# Patient Record
Sex: FEMALE | Employment: FULL TIME | URBAN - METROPOLITAN AREA
[De-identification: names, ages, dates, MRNs, and addresses within clinical notes are randomized per-mention and may not be internally consistent; named-entity substitution may affect disease eponyms.]

---

## 2018-08-22 ENCOUNTER — APPOINTMENT (OUTPATIENT)
Dept: OTHER | Facility: HOSPITAL | Age: 23
End: 2018-08-22
Attending: PREVENTIVE MEDICINE

## 2019-04-08 PROCEDURE — 87389 HIV-1 AG W/HIV-1&-2 AB AG IA: CPT | Performed by: OBSTETRICS & GYNECOLOGY

## 2019-04-08 PROCEDURE — 80074 ACUTE HEPATITIS PANEL: CPT | Performed by: OBSTETRICS & GYNECOLOGY

## 2019-04-08 PROCEDURE — 88175 CYTOPATH C/V AUTO FLUID REDO: CPT | Performed by: OBSTETRICS & GYNECOLOGY

## 2019-07-20 PROBLEM — Z97.5 IUD (INTRAUTERINE DEVICE) IN PLACE: Status: ACTIVE | Noted: 2019-07-20

## 2024-03-01 ENCOUNTER — LAB ENCOUNTER (OUTPATIENT)
Dept: LAB | Facility: REFERENCE LAB | Age: 29
End: 2024-03-01
Attending: FAMILY MEDICINE
Payer: COMMERCIAL

## 2024-03-01 ENCOUNTER — OFFICE VISIT (OUTPATIENT)
Facility: CLINIC | Age: 29
End: 2024-03-01
Payer: COMMERCIAL

## 2024-03-01 VITALS
OXYGEN SATURATION: 99 % | HEART RATE: 81 BPM | HEIGHT: 67.32 IN | WEIGHT: 147.25 LBS | DIASTOLIC BLOOD PRESSURE: 60 MMHG | BODY MASS INDEX: 22.84 KG/M2 | SYSTOLIC BLOOD PRESSURE: 110 MMHG

## 2024-03-01 DIAGNOSIS — F98.8 ATTENTION DEFICIT DISORDER (ADD) WITHOUT HYPERACTIVITY: ICD-10-CM

## 2024-03-01 DIAGNOSIS — Z97.5 IUD (INTRAUTERINE DEVICE) IN PLACE: ICD-10-CM

## 2024-03-01 DIAGNOSIS — Z00.00 ENCOUNTER FOR GENERAL ADULT MEDICAL EXAMINATION W/O ABNORMAL FINDINGS: Primary | ICD-10-CM

## 2024-03-01 DIAGNOSIS — Z12.4 CERVICAL CANCER SCREENING: ICD-10-CM

## 2024-03-01 DIAGNOSIS — F32.5 MAJOR DEPRESSIVE DISORDER WITH SINGLE EPISODE, IN FULL REMISSION (HCC): ICD-10-CM

## 2024-03-01 DIAGNOSIS — Z00.00 ENCOUNTER FOR GENERAL ADULT MEDICAL EXAMINATION W/O ABNORMAL FINDINGS: ICD-10-CM

## 2024-03-01 DIAGNOSIS — R22.0 SCALP MASS: ICD-10-CM

## 2024-03-01 DIAGNOSIS — Z23 NEED FOR VACCINATION: ICD-10-CM

## 2024-03-01 LAB
ALBUMIN SERPL-MCNC: 4.8 G/DL (ref 3.2–4.8)
ALBUMIN/GLOB SERPL: 1.6 {RATIO} (ref 1–2)
ALP LIVER SERPL-CCNC: 47 U/L
ALT SERPL-CCNC: 18 U/L
ANION GAP SERPL CALC-SCNC: 8 MMOL/L (ref 0–18)
AST SERPL-CCNC: 26 U/L (ref ?–34)
BASOPHILS # BLD AUTO: 0.05 X10(3) UL (ref 0–0.2)
BASOPHILS NFR BLD AUTO: 0.8 %
BILIRUB SERPL-MCNC: 0.5 MG/DL (ref 0.3–1.2)
BUN BLD-MCNC: 14 MG/DL (ref 9–23)
BUN/CREAT SERPL: 18.9 (ref 10–20)
CALCIUM BLD-MCNC: 9.6 MG/DL (ref 8.7–10.4)
CHLORIDE SERPL-SCNC: 109 MMOL/L (ref 98–112)
CHOLEST SERPL-MCNC: 183 MG/DL (ref ?–200)
CO2 SERPL-SCNC: 23 MMOL/L (ref 21–32)
CREAT BLD-MCNC: 0.74 MG/DL
DEPRECATED RDW RBC AUTO: 42.5 FL (ref 35.1–46.3)
EGFRCR SERPLBLD CKD-EPI 2021: 112 ML/MIN/1.73M2 (ref 60–?)
EOSINOPHIL # BLD AUTO: 0.28 X10(3) UL (ref 0–0.7)
EOSINOPHIL NFR BLD AUTO: 4.2 %
ERYTHROCYTE [DISTWIDTH] IN BLOOD BY AUTOMATED COUNT: 11.9 % (ref 11–15)
EST. AVERAGE GLUCOSE BLD GHB EST-MCNC: 85 MG/DL (ref 68–126)
FASTING PATIENT LIPID ANSWER: YES
FASTING STATUS PATIENT QL REPORTED: YES
GLOBULIN PLAS-MCNC: 3 G/DL (ref 2.8–4.4)
GLUCOSE BLD-MCNC: 84 MG/DL (ref 70–99)
HBA1C MFR BLD: 4.6 % (ref ?–5.7)
HCT VFR BLD AUTO: 39.7 %
HDLC SERPL-MCNC: 60 MG/DL (ref 40–59)
HGB BLD-MCNC: 13.7 G/DL
IMM GRANULOCYTES # BLD AUTO: 0.02 X10(3) UL (ref 0–1)
IMM GRANULOCYTES NFR BLD: 0.3 %
LDLC SERPL CALC-MCNC: 105 MG/DL (ref ?–100)
LYMPHOCYTES # BLD AUTO: 1.86 X10(3) UL (ref 1–4)
LYMPHOCYTES NFR BLD AUTO: 28.1 %
MCH RBC QN AUTO: 33.4 PG (ref 26–34)
MCHC RBC AUTO-ENTMCNC: 34.5 G/DL (ref 31–37)
MCV RBC AUTO: 96.8 FL
MONOCYTES # BLD AUTO: 0.45 X10(3) UL (ref 0.1–1)
MONOCYTES NFR BLD AUTO: 6.8 %
NEUTROPHILS # BLD AUTO: 3.97 X10 (3) UL (ref 1.5–7.7)
NEUTROPHILS # BLD AUTO: 3.97 X10(3) UL (ref 1.5–7.7)
NEUTROPHILS NFR BLD AUTO: 59.8 %
NONHDLC SERPL-MCNC: 123 MG/DL (ref ?–130)
OSMOLALITY SERPL CALC.SUM OF ELEC: 290 MOSM/KG (ref 275–295)
PLATELET # BLD AUTO: 343 10(3)UL (ref 150–450)
POTASSIUM SERPL-SCNC: 3.6 MMOL/L (ref 3.5–5.1)
PROT SERPL-MCNC: 7.8 G/DL (ref 5.7–8.2)
RBC # BLD AUTO: 4.1 X10(6)UL
SODIUM SERPL-SCNC: 140 MMOL/L (ref 136–145)
TRIGL SERPL-MCNC: 98 MG/DL (ref 30–149)
TSI SER-ACNC: 0.65 MIU/ML (ref 0.55–4.78)
VLDLC SERPL CALC-MCNC: 16 MG/DL (ref 0–30)
WBC # BLD AUTO: 6.6 X10(3) UL (ref 4–11)

## 2024-03-01 PROCEDURE — 3074F SYST BP LT 130 MM HG: CPT | Performed by: FAMILY MEDICINE

## 2024-03-01 PROCEDURE — 80061 LIPID PANEL: CPT | Performed by: FAMILY MEDICINE

## 2024-03-01 PROCEDURE — 88175 CYTOPATH C/V AUTO FLUID REDO: CPT | Performed by: FAMILY MEDICINE

## 2024-03-01 PROCEDURE — 83036 HEMOGLOBIN GLYCOSYLATED A1C: CPT | Performed by: FAMILY MEDICINE

## 2024-03-01 PROCEDURE — 90686 IIV4 VACC NO PRSV 0.5 ML IM: CPT | Performed by: FAMILY MEDICINE

## 2024-03-01 PROCEDURE — 99385 PREV VISIT NEW AGE 18-39: CPT | Performed by: FAMILY MEDICINE

## 2024-03-01 PROCEDURE — 90715 TDAP VACCINE 7 YRS/> IM: CPT | Performed by: FAMILY MEDICINE

## 2024-03-01 PROCEDURE — 3008F BODY MASS INDEX DOCD: CPT | Performed by: FAMILY MEDICINE

## 2024-03-01 PROCEDURE — 80050 GENERAL HEALTH PANEL: CPT | Performed by: FAMILY MEDICINE

## 2024-03-01 PROCEDURE — 3078F DIAST BP <80 MM HG: CPT | Performed by: FAMILY MEDICINE

## 2024-03-01 PROCEDURE — 90472 IMMUNIZATION ADMIN EACH ADD: CPT | Performed by: FAMILY MEDICINE

## 2024-03-01 PROCEDURE — 90471 IMMUNIZATION ADMIN: CPT | Performed by: FAMILY MEDICINE

## 2024-03-01 RX ORDER — EPINEPHRINE 0.3 MG/.3ML
0.3 INJECTION SUBCUTANEOUS ONCE
COMMUNITY

## 2024-03-01 RX ORDER — AMPHETAMINE 9.4 MG/1
9.4 TABLET, ORALLY DISINTEGRATING ORAL DAILY
COMMUNITY
Start: 2024-02-15

## 2024-03-01 RX ORDER — FLUOXETINE HYDROCHLORIDE 20 MG/1
20 CAPSULE ORAL DAILY
COMMUNITY

## 2024-03-01 NOTE — PROGRESS NOTES
Subjective:   Ruth Rodriguez is a 28 year old female who presents for Establish Care (Patient comes to the office as a new patient to establish care. ) and Physical (Patient is requesting annual physical exam. )     Head trauma  Occurred 6 years ago. Fell backwards onto staircase and hit occiput. Did not seek care. No loss of consciousness. Still has palpable mass in that area. Not growing. Not painful.    ADD/Depression  On adzenys and fluoxetine per psychiatry. Doing well.         No LMP recorded. (Menstrual status: IUD - Intrauterine Device).  Has Kyleena in place. Due to get removed and reinserted this year.     Last pap 2019. Due. Never abnormal    History/Other:    Chief Complaint Reviewed and Verified  Nursing Notes Reviewed and   Verified  Tobacco Reviewed  Allergies Reviewed  Medications Reviewed    Problem List Reviewed  Medical History Reviewed  Surgical History   Reviewed  Family History Reviewed  Social History Reviewed         Tobacco:       Current Outpatient Medications   Medication Sig Dispense Refill    FLUoxetine 20 MG Oral Cap Take 1 capsule (20 mg total) by mouth daily.      ADZENYS XR-ODT 9.4 MG Oral Tablet Extended Release Dispersible Take 9.4 mg by mouth daily.      EPINEPHrine (EPIPEN 2-SALIMA) 0.3 MG/0.3ML Injection Solution Auto-injector Inject 0.3 mL (1 each total) as directed one time.           Review of Systems:  Review of Systems   Constitutional: Negative.    HENT: Negative.     Eyes: Negative.    Respiratory: Negative.     Cardiovascular: Negative.    Gastrointestinal: Negative.    Genitourinary: Negative.    Musculoskeletal: Negative.    Skin: Negative.         +mass on head   Neurological: Negative.    Psychiatric/Behavioral: Negative.           Objective:   /60 (BP Location: Left arm, Patient Position: Sitting, Cuff Size: adult)   Pulse 81   Ht 5' 7.32\" (1.71 m)   Wt 147 lb 4 oz (66.8 kg)   SpO2 99%   BMI 22.84 kg/m²  Estimated body mass index is 22.84 kg/m² as  calculated from the following:    Height as of this encounter: 5' 7.32\" (1.71 m).    Weight as of this encounter: 147 lb 4 oz (66.8 kg).  Physical Exam  Vitals and nursing note reviewed.   Constitutional:       General: She is not in acute distress.     Appearance: Normal appearance. She is not ill-appearing.   HENT:      Head: Normocephalic and atraumatic.      Comments: +hard mass on occiput, midline, nontender, nonmobile, 1 cm in diameter     Right Ear: Tympanic membrane normal. There is no impacted cerumen.      Left Ear: Tympanic membrane normal. There is no impacted cerumen.      Mouth/Throat:      Mouth: Mucous membranes are moist.      Pharynx: Oropharynx is clear. No oropharyngeal exudate or posterior oropharyngeal erythema.   Eyes:      General:         Right eye: No discharge.         Left eye: No discharge.      Extraocular Movements: Extraocular movements intact.      Pupils: Pupils are equal, round, and reactive to light.   Cardiovascular:      Rate and Rhythm: Normal rate and regular rhythm.      Heart sounds: Normal heart sounds. No murmur heard.     No friction rub. No gallop.   Pulmonary:      Effort: Pulmonary effort is normal.      Breath sounds: Normal breath sounds. No wheezing, rhonchi or rales.   Abdominal:      General: Abdomen is flat. Bowel sounds are normal. There is no distension.      Palpations: Abdomen is soft. There is no mass.      Tenderness: There is no abdominal tenderness. There is no guarding or rebound.   Genitourinary:     Cervix: Normal. No cervical motion tenderness, discharge, friability, lesion, erythema or cervical bleeding.   Musculoskeletal:         General: Normal range of motion.      Cervical back: Normal range of motion.      Right lower leg: No edema.      Left lower leg: No edema.   Skin:     General: Skin is warm and dry.      Findings: No rash.   Neurological:      General: No focal deficit present.      Mental Status: She is alert. Mental status is at  baseline.   Psychiatric:         Mood and Affect: Mood normal.         Behavior: Behavior normal.         Assessment & Plan:   1. Encounter for general adult medical examination w/o abnormal findings (Primary)  -     TETANUS, DIPHTHERIA TOXOIDS AND ACELLULAR PERTUSIS VACCINE (TDAP), >7 YEARS, IM USE  -     Fluzone Quadrivalent 6mo+ 0.5mL  -     CBC With Differential With Platelet; Future; Expected date: 03/01/2024  -     Lipid Panel; Future; Expected date: 03/01/2024  -     Comp Metabolic Panel (14); Future; Expected date: 03/01/2024  -     Hemoglobin A1C; Future; Expected date: 03/01/2024  -     TSH W Reflex To Free T4; Future; Expected date: 03/01/2024    -collected labs. 3 hours fasted.     2. Cervical cancer screening  -     ThinPrep PAP with HPV Reflex Request B; Future; Expected date: 03/01/2024    -collected    3. Need for vaccination  -     TETANUS, DIPHTHERIA TOXOIDS AND ACELLULAR PERTUSIS VACCINE (TDAP), >7 YEARS, IM USE  -     Fluzone Quadrivalent 6mo+ 0.5mL    -administered by staff    4. Attention deficit disorder (ADD) without hyperactivity  -well controlled on current medications. No regimen changes. To continue following with psychiatry    5. IUD (intrauterine device) in place  -patient will inform provider when would like to schedule for replacement and reinsertion    6. Scalp mass  Given no change in the past 6 years and no associated symptoms. Recommended monitoring. If begins to grow or become painful to come back in for reevaluation    7. Major depressive disorder with single episode, in full remission (HCC)  -well controlled on current medications. No regimen changes. To continue following with psychiatry        Return in about 1 year (around 3/1/2025), or if symptoms worsen or fail to improve.    Danielle Becerril MD, 3/1/2024, 11:13 AM

## 2024-06-14 ENCOUNTER — PATIENT MESSAGE (OUTPATIENT)
Facility: CLINIC | Age: 29
End: 2024-06-14

## 2024-06-14 NOTE — TELEPHONE ENCOUNTER
From: Ruth Rodriguez  To: Danielle Becerril  Sent: 6/14/2024 12:15 PM CDT  Subject: Constant Cold Sores    Hi Dr. Becerril! I have struggled with cold sores my whole life but they are starting to get worse and more painful. I was talking to my sister about it and she mentioned she was put on a medication to help and hasn’t gotten one since. I was hoping we could possibly discuss my options? I attached a photo for reference. Thank you!

## 2024-10-28 ENCOUNTER — OFFICE VISIT (OUTPATIENT)
Facility: CLINIC | Age: 29
End: 2024-10-28
Payer: COMMERCIAL

## 2024-10-28 VITALS
HEART RATE: 69 BPM | HEIGHT: 68 IN | OXYGEN SATURATION: 99 % | DIASTOLIC BLOOD PRESSURE: 80 MMHG | SYSTOLIC BLOOD PRESSURE: 122 MMHG | BODY MASS INDEX: 24.4 KG/M2 | WEIGHT: 161 LBS

## 2024-10-28 DIAGNOSIS — B00.1 RECURRENT COLD SORES: Primary | ICD-10-CM

## 2024-10-28 DIAGNOSIS — Z30.433 ENCOUNTER FOR IUD REMOVAL AND REINSERTION: ICD-10-CM

## 2024-10-28 LAB — POCT MAN UR PREGNANCY: NEGATIVE

## 2024-10-28 PROCEDURE — 87491 CHLMYD TRACH DNA AMP PROBE: CPT | Performed by: FAMILY MEDICINE

## 2024-10-28 PROCEDURE — 87591 N.GONORRHOEAE DNA AMP PROB: CPT | Performed by: FAMILY MEDICINE

## 2024-10-28 RX ORDER — LEVONORGESTREL 19.5 MG/1
1 INTRAUTERINE DEVICE INTRAUTERINE ONCE
COMMUNITY
End: 2024-10-28

## 2024-10-28 RX ORDER — VALACYCLOVIR HYDROCHLORIDE 1 G/1
1000 TABLET, FILM COATED ORAL DAILY
Qty: 90 TABLET | Refills: 1 | Status: SHIPPED | OUTPATIENT
Start: 2024-10-28

## 2024-10-28 NOTE — PROGRESS NOTES
Subjective:   Ruth Rodriguez is a 29 year old female who presents for Procedure (IUD insertion and removal)     Patient presents for IUD insertion and would like to discuss cold sores. Gets an outbreak every month. Sister recently was placed on a preventative medications which has helped. Patient is wondering if can try similar medication.     History/Other:    Chief Complaint Reviewed and Verified  Nursing Notes Reviewed and   Verified  Tobacco Reviewed  Allergies Reviewed  Medications Reviewed    Problem List Reviewed  Medical History Reviewed  Surgical History   Reviewed  OB Status Reviewed  Family History Reviewed  Social History   Reviewed         Tobacco:  Social History     Tobacco Use   Smoking Status Never   Smokeless Tobacco Current     E-Cigarettes/Vaping       Questions Responses    E-Cigarette Use User - Current Status Unknown               Current Outpatient Medications   Medication Sig Dispense Refill    valACYclovir 1 G Oral Tab Take 1 tablet (1,000 mg total) by mouth daily. 90 tablet 1    FLUoxetine 20 MG Oral Cap Take 1 capsule (20 mg total) by mouth daily.      ADZENYS XR-ODT 9.4 MG Oral Tablet Extended Release Dispersible Take 9.4 mg by mouth daily.      EPINEPHrine (EPIPEN 2-SALIMA) 0.3 MG/0.3ML Injection Solution Auto-injector Inject 0.3 mL (1 each total) as directed one time.           Review of Systems:  Review of Systems   Constitutional: Negative.    Respiratory: Negative.     Cardiovascular: Negative.    Gastrointestinal: Negative.    Skin:         +cold sores   Neurological: Negative.          Objective:   /80   Pulse 69   Ht 5' 8\" (1.727 m)   Wt 161 lb (73 kg)   SpO2 99%   BMI 24.48 kg/m²  Estimated body mass index is 24.48 kg/m² as calculated from the following:    Height as of this encounter: 5' 8\" (1.727 m).    Weight as of this encounter: 161 lb (73 kg).  Physical Exam  Vitals and nursing note reviewed.   Constitutional:       General: She is not in acute  distress.     Appearance: Normal appearance.   HENT:      Head: Normocephalic and atraumatic.   Eyes:      General:         Right eye: No discharge.         Left eye: No discharge.      Comments: Extraocular eye movements grossly intact   Pulmonary:      Effort: Pulmonary effort is normal.   Musculoskeletal:      Comments: Normal gait   Skin:     Findings: No rash.   Neurological:      General: No focal deficit present.      Mental Status: She is alert. Mental status is at baseline.   Psychiatric:         Mood and Affect: Mood normal.         Behavior: Behavior normal.           Assessment & Plan:   1. Recurrent cold sores (Primary)  -     valACYclovir HCl; Take 1 tablet (1,000 mg total) by mouth daily.  Dispense: 90 tablet; Refill: 1    -sent in suppressive dose valacyclovir for patient to trial    2. Encounter for IUD removal and reinsertion  -     POCT Pregnancy, Urine--> negative  -     Chlamydia/Gc Amplification; Future; Expected date: 10/28/2024  -     Chlamydia/Gc Amplification  -     Levonorgestrel    -removed and reinserted kyleena IUD. Patient tolerated well. See separate procedure note      Return in about 4 weeks (around 11/25/2024) for IUD string check.    Danielle Becerril MD, 10/28/2024, 2:33 PM

## 2024-10-28 NOTE — PROGRESS NOTES
IUD Insertion     Pregnancy Results: negative from urine test   Birth control method(s) used: Kyleena IUD; date last used: 10/28/2024    Consent signed.  Procedure discussed with the patient in detail including indication, risks, benefits, alternatives and complications.    Pelvic Exam Findings:  Uterus is anteverted.     Procedure:  Speculum placed in the vagina.  Betadine wash of vagina and cervix.  Single tooth tenaculum was placed at the 12 o'clock position.  Cervical stenosis not encountered.   Uterus sounded to 8 cm.  Kyleena  IUD was placed without difficulty.  Strings cut at 3 cm.  Single tooth tenaculum removed.  Cotton swabs used to achieve hemostasis.  Good hemostasis noted.  GC/CHL screen performed.  Patient tolerated procedure well.      Visit Plan:  IUD surveillance was discussed with the patient.    Danielle Becerril MD  10/28/2024  2:21 PM

## 2024-10-28 NOTE — PATIENT INSTRUCTIONS
IUD Insertion Aftercare    Progestin IUD (Mirena®, Liletta®, Santa®)   It begins working in 7 days to prevent pregnancy.   You MUST use condoms for the first 7 days after your IUD was inserted. If you have sex without using a condom, you will need to take emergency contraception as soon as possible to prevent pregnancy.    Today you may go back to school or work after your visit. You must wait 24 hours after your IUD is put in before you can use tampons, take a bath, or have vaginal sex.  You may have more cramps or heavier bleeding with your periods, or spotting between your periods. This is normal. The cramping and bleeding can last for 3-6 months. After 6 months, the  cramping and bleeding should get better. Many women will stop having periods after 1 or 2 years with IUDs. If you have the Paragard® (copper) IUD, you may have more cramping and more bleeding with your periods as long as you have the IUD inside you.    Ibuprofen (also called Advil® or Motrin®) helps decrease the bleeding and cramping. You can buy Ibuprofen at any drug store without a prescription. You can take as many as 4 pills (800 mg) of Ibuprofen every 8 hours with food (each pill contains 200 mg). To prevent cramping, start taking Ibuprofen as soon as your period starts and keep taking it every 8 hours for the first 2-3 days of your period. You can also put a hot water bottle on your belly if you have bad cramps. Your IUD may come out by itself in the first three months. If you can feel the strings, the IUD is in the right place. If your IUD comes out, you can become pregnant immediately. If you are not sure how to check the strings, we can help you. Meanwhile, use condoms.     The IUD does NOT protect you against sexually transmitted infections. ALWAYS use protection against sexually transmitted infections (male condoms, female condoms, dental dams) if you are at risk. If you think you have been exposed to an STI, discuss testing with your  healthcare provider. Most infections can be treated WITHOUT removing your IUD.    WARNING SIGNS:  Within the first 3 weeks of the IUD insertion:  - Fever (>101F)  - Chills  - Strong or sharp pain in your stomach or belly    At Any Time (these are very rare):  - Late period (for Paragard® users)  - Feeling pregnant (breast tenderness, nausea, vomiting)  - Positive home pregnancy test    If you develop any of the above warning signs, please see your healthcare provider immediately.

## 2024-10-29 LAB
C TRACH DNA SPEC QL NAA+PROBE: NEGATIVE
N GONORRHOEA DNA SPEC QL NAA+PROBE: NEGATIVE

## 2024-12-16 DIAGNOSIS — B00.1 RECURRENT COLD SORES: ICD-10-CM

## 2024-12-19 RX ORDER — VALACYCLOVIR HYDROCHLORIDE 1 G/1
1000 TABLET, FILM COATED ORAL DAILY
Qty: 90 TABLET | Refills: 1 | OUTPATIENT
Start: 2024-12-19

## 2024-12-20 NOTE — TELEPHONE ENCOUNTER
Refill passed per [facility] protocol.    Requested Prescriptions   Pending Prescriptions Disp Refills    FLUoxetine 20 MG Oral Cap  0     Sig: Take 1 capsule (20 mg total) by mouth daily.       Psychiatric Non-Scheduled (Anti-Anxiety) Passed - 12/19/2024  9:23 PM        Passed - In person appointment or virtual visit in the past 6 mos or appointment in next 3 mos     Recent Outpatient Visits              1 month ago Recurrent cold sores    [facility] Danielle Becerril MD    Office Visit    9 months ago Encounter for general adult medical examination w/o abnormal findings    [facility] Danielle Becerril MD    Office Visit    3 years ago Tinea versicolor    Dermatology - [facility], Poppy Nuñez PA    Office Visit    5 years ago Vaginal discharge    OB-GYN - [address], Clara Nelson MD    Office Visit    5 years ago Vitamin B12 deficiency    Family Medicine - Ronak Ziegler, [place]    Nurse Only          Future Appointments         Provider Department Appt Notes    In 1 week Danielle Becerril MD [facility] Check after IUD insertion  F/U; [employer] PPO                    Passed - Depression Screening completed within the past 12 months          valACYclovir 1 G Oral Tab 90 tablet 1     Sig: Take 1 tablet (1,000 mg total) by mouth daily.       Herpes Agent Protocol Passed - 12/19/2024  9:23 PM        Passed - In person appointment or virtual visit in the past 12 mos or appointment in next 3 mos     Recent Outpatient Visits              1 month ago Recurrent cold sores    [facility] Danielle Becerril MD    Office Visit    9 months ago Encounter for general adult medical examination w/o abnormal findings    [facility] Danielle Becerril MD    Office Visit    3 years ago Tinea  versicolor    Dermatology - [facility] [address], Poppy Nuñez PA    Office Visit    5 years ago Vaginal discharge    OB-GYN - [place] V[place] Christine B, MD    Office Visit    5 years ago Vitamin B12 deficiency    Family Medicine - Ronak Ziegler, [place]    Nurse Only          Future Appointments         Provider Department Appt Notes    In 1 week Danielle Becerril MD [facility] Check after IUD insertion  F/U; [employer] PPO                       Future Appointments         Provider Department Appt Notes    In 1 week Danielle Becerril MD [facility] Check after IUD insertion  F/U; [employer] PPO          Recent Outpatient Visits              1 month ago Recurrent cold sores    [facility] Danielle Becerril MD    Office Visit    9 months ago Encounter for general adult medical examination w/o abnormal findings    [facility] Danielle Becerril MD    Office Visit    3 years ago Tinea versicolor    Dermatology - [facility] [address], Poppy Nuñez PA    Office Visit    5 years ago Vaginal discharge    OB-GYN - [place] Christine B, MD    Office Visit    5 years ago Vitamin B12 deficiency    Family Medicine - Ronak Ziegler, [place]    Nurse Only

## 2024-12-20 NOTE — TELEPHONE ENCOUNTER
Refill passed per [facility] protocol.    Medication is listed as patient reported, not yet prescribed by provider     Routing to POD mate as Dr Becerril is out of the office     Medication List  As of 10/28/2024  2:59 PM    Amphetamine 9.4 mg Daily    EPINEPHrine 0.3 MG/0.3ML 0.3 mL Once    FLUoxetine HCl 20 mg Daily    valACYclovir HCl 1,000 mg Oral Daily    Requested Prescriptions   Pending Prescriptions Disp Refills    FLUoxetine 20 MG Oral Cap  0     Sig: Take 1 capsule (20 mg total) by mouth daily.       Psychiatric Non-Scheduled (Anti-Anxiety) Passed - 12/19/2024  9:24 PM        Passed - In person appointment or virtual visit in the past 6 mos or appointment in next 3 mos     Recent Outpatient Visits              1 month ago Recurrent cold sores    [facility] Danielle Becerril MD    Office Visit    9 months ago Encounter for general adult medical examination w/o abnormal findings    [facility] Danielle Becerril MD    Office Visit    3 years ago Tinea versicolor    Dermatology - [facility] Chloe Poppy Nuñez PA    Office Visit    5 years ago Vaginal discharge    OB-GYN - [place] Vora Christine B, MD    Office Visit    5 years ago Vitamin B12 deficiency    Family Medicine - Ronak Ziegler, [place]    Nurse Only          Future Appointments         Provider Department Appt Notes    In 1 week Danielle Becerril MD [facility] Check after IUD insertion  F/U; [employer] PPO                    Passed - Depression Screening completed within the past 12 months          valACYclovir 1 G Oral Tab 90 tablet 1     Sig: Take 1 tablet (1,000 mg total) by mouth daily.       Herpes Agent Protocol Passed - 12/19/2024  9:24 PM        Passed - In person appointment or virtual visit in the past 12 mos or appointment in next 3 mos     Recent Outpatient Visits              1  month ago Recurrent cold sores    [facility] Danielle Becerril MD    Office Visit    9 months ago Encounter for general adult medical examination w/o abnormal findings    [facility] Danielle Becerril MD    Office Visit    3 years ago Tinea versicolor    Dermatology - [facility], Poppy Nuñez PA    Office Visit    5 years ago Vaginal discharge    OB-GYN - [address], Clara Nelson MD    Office Visit    5 years ago Vitamin B12 deficiency    Family Medicine - Ronak Ziegler, [place]    Nurse Only          Future Appointments         Provider Department Appt Notes    In 1 week Danielle Becerril MD [facility] Check after IUD insertion  F/U; [employer] PPO                       Future Appointments         Provider Department Appt Notes    In 1 week Danielle Becerril MD [facility] Check after IUD insertion  F/U; [employer] PPO          Recent Outpatient Visits              1 month ago Recurrent cold sores    [facility] Danielle Gardner MD    Office Visit    9 months ago Encounter for general adult medical examination w/o abnormal findings    [facility] Danielle Becerril MD    Office Visit    3 years ago Tinea versicolor    Dermatology - [facility] [address], Poppy Nuñez PA    Office Visit    5 years ago Vaginal discharge    OB-GYN - [address], Clara Nelson MD    Office Visit    5 years ago Vitamin B12 deficiency    Family Medicine - Ronak Ziegler, [place]    Nurse Only

## 2025-02-13 ENCOUNTER — OFFICE VISIT (OUTPATIENT)
Facility: CLINIC | Age: 30
End: 2025-02-13
Payer: COMMERCIAL

## 2025-02-13 VITALS
WEIGHT: 158 LBS | HEIGHT: 68 IN | OXYGEN SATURATION: 99 % | DIASTOLIC BLOOD PRESSURE: 82 MMHG | SYSTOLIC BLOOD PRESSURE: 122 MMHG | HEART RATE: 68 BPM | BODY MASS INDEX: 23.95 KG/M2

## 2025-02-13 DIAGNOSIS — Z91.018 NUT ALLERGY: Primary | ICD-10-CM

## 2025-02-13 DIAGNOSIS — Z30.431 IUD CHECK UP: ICD-10-CM

## 2025-02-13 PROCEDURE — 3074F SYST BP LT 130 MM HG: CPT | Performed by: FAMILY MEDICINE

## 2025-02-13 PROCEDURE — 3008F BODY MASS INDEX DOCD: CPT | Performed by: FAMILY MEDICINE

## 2025-02-13 PROCEDURE — 3079F DIAST BP 80-89 MM HG: CPT | Performed by: FAMILY MEDICINE

## 2025-02-13 RX ORDER — EPINEPHRINE 0.3 MG/.3ML
0.3 INJECTION SUBCUTANEOUS ONCE
Qty: 1 EACH | Refills: 0 | Status: SHIPPED | OUTPATIENT
Start: 2025-02-13 | End: 2025-02-13

## 2025-02-13 RX ORDER — AMPHETAMINE 12.5 MG/1
TABLET, ORALLY DISINTEGRATING ORAL
COMMUNITY
Start: 2025-01-20

## 2025-02-13 NOTE — PROGRESS NOTES
Subjective:   Ruth Rodriguez is a 29 year old female who presents for Follow - Up     Patient presents for kyleena IUD string check inserted on 10/28/24. Doing well. No pain. No spotting.    Also needs refill of epipen for nut allergy.     History/Other:    Chief Complaint Reviewed and Verified  No Further Nursing Notes to   Review  Tobacco Reviewed  Allergies Reviewed  Medications Reviewed    Problem List Reviewed  Medical History Reviewed  Surgical History   Reviewed  OB Status Reviewed  Family History Reviewed  Social History   Reviewed         Tobacco:  Social History     Tobacco Use   Smoking Status Never   Smokeless Tobacco Current     E-Cigarettes/Vaping       Questions Responses    E-Cigarette Use User - Current Status Unknown               Current Outpatient Medications   Medication Sig Dispense Refill    ADZENYS XR-ODT 12.5 MG Oral Tablet Extended Release Dispersible REMOVE FROM BLISTER AND IMMEDIATELY PLACE 1 TABLET ON TOP OF THE TONGUE WHERE IT WILL DISINTEGRATE; THEN SWALLOW WITH SALIVA DAILY IN THE MORNING AFTER BREAKFAST      EPINEPHrine (EPIPEN 2-SALIMA) 0.3 MG/0.3ML Injection Solution Auto-injector Inject 0.3 mL (1 each total) as directed one time for 1 dose. 1 each 0    FLUoxetine 20 MG Oral Cap Take 1 capsule (20 mg total) by mouth daily. 90 capsule 0    valACYclovir 1 G Oral Tab Take 1 tablet (1,000 mg total) by mouth daily. 90 tablet 1    ADZENYS XR-ODT 9.4 MG Oral Tablet Extended Release Dispersible Take 9.4 mg by mouth daily. (Patient not taking: Reported on 2/13/2025)           Review of Systems:  Review of Systems   Constitutional: Negative.    Respiratory: Negative.     Cardiovascular: Negative.    Gastrointestinal: Negative.    Skin: Negative.    Neurological: Negative.          Objective:   /82   Pulse 68   Ht 5' 8\" (1.727 m)   Wt 158 lb (71.7 kg)   SpO2 99%   BMI 24.02 kg/m²  Estimated body mass index is 24.02 kg/m² as calculated from the following:    Height as of  this encounter: 5' 8\" (1.727 m).    Weight as of this encounter: 158 lb (71.7 kg).  Physical Exam  Vitals and nursing note reviewed.   Constitutional:       General: She is not in acute distress.     Appearance: Normal appearance.   HENT:      Head: Normocephalic and atraumatic.   Eyes:      General:         Right eye: No discharge.         Left eye: No discharge.      Comments: Extraocular eye movements grossly intact   Pulmonary:      Effort: Pulmonary effort is normal.   Musculoskeletal:      Comments: Normal gait   Skin:     Findings: No rash.   Neurological:      General: No focal deficit present.      Mental Status: She is alert. Mental status is at baseline.   Psychiatric:         Mood and Affect: Mood normal.         Behavior: Behavior normal.           Assessment & Plan:   1. Nut allergy (Primary)  -     EPINEPHrine; Inject 0.3 mL (1 each total) as directed one time for 1 dose.  Dispense: 1 each; Refill: 0    Refilled EpiPen    2. IUD check up  IUD strings visible at cervical os.  IUD in place. To replace in 10/28/29      Return in about 1 month (around 3/13/2025) for Wellness.    Danielle Becerril MD, 2/13/2025, 3:16 PM

## 2025-07-01 DIAGNOSIS — B00.1 RECURRENT COLD SORES: ICD-10-CM

## 2025-07-01 RX ORDER — VALACYCLOVIR HYDROCHLORIDE 1 G/1
1000 TABLET, FILM COATED ORAL DAILY
Qty: 90 TABLET | Refills: 3 | Status: SHIPPED | OUTPATIENT
Start: 2025-07-01

## 2025-07-02 NOTE — TELEPHONE ENCOUNTER
Refill passed per [facility] protocols.    Requested Prescriptions   Pending Prescriptions Disp Refills    valACYclovir 1 G Oral Tab 90 tablet 3     Sig: Take 1 tablet (1,000 mg total) by mouth daily.       Herpes Agent Protocol Passed - 7/1/2025  8:15 PM

## (undated) NOTE — MR AVS SNAPSHOT
After Visit Summary   3/1/2024    Ruth Rodriguez   MRN: [id number]           Visit Information     Date & Time  3/1/2024 11:00 AM Provider  Danielle Becerril MD [facility] Dept. Phone  [phone]      Your Vitals Were  Most recent update: 3/1/2024 11:10 AM    BP   110/60 (BP Location: Left arm, Patient Position: Sitting, Cuff Size: adult)          Pulse   81          Ht   67.32\"          Wt   147 lb 4 oz          SpO2   99%             BMI   22.84 kg/m²         Allergies as of 3/1/2024  Review status set to Review Complete on 3/1/2024       Noted Reaction Type Reactions    Nuts 03/01/2024    Coughing, HIVES, ITCHING, NAUSEA AND VOMITING, RASH, Tightness in Throat, TONGUE SWELLING      Your Current Medications        Dosage    FLUoxetine 20 MG Oral Cap Take 1 capsule (20 mg total) by mouth daily.    ADZENYS XR-ODT 9.4 MG Oral Tablet Extended Release Dispersible Take 9.4 mg by mouth daily.    EPINEPHrine (EPIPEN 2-SALIMA) 0.3 MG/0.3ML Injection Solution Auto-injector Inject 0.3 mL (1 each total) as directed one time.      Diagnoses for This Visit    Encounter for general adult medical examination w/o abnormal findings   [296962]  -  Primary  Cervical cancer screening   [516568]    Need for vaccination   [628362]    Attention deficit disorder (ADD) without hyperactivity   [1030568]    IUD (intrauterine device) in place   [111713]    Scalp mass   [595607]    Major depressive disorder with single episode, in full remission   [4193504]             Follow-up    Return in about 1 year (around 3/1/2025), or if symptoms worsen or fail to improve.     We Ordered the Following     Normal Orders This Visit    Fluzone Quadrivalent 6mo+ 0.5mL [76418 CPT(R)]     TETANUS, DIPHTHERIA TOXOIDS AND ACELLULAR PERTUSIS VACCINE (TDAP), >7 YEARS, IM USE [28307 CPT(R)]     ThinPrep PAP with HPV Reflex Request B [[id number] CUSTOM]     ThinPrep PAP with HPV Reflex Request [[id number] CUSTOM]      Future Labs/Procedures Expected by Expires    CBC W Differential W Platelet [E] [[id number] CUSTOM]  3/1/2024 (Approximate) 3/1/2025    Comp Metabolic Panel (14) [E] [[id number] CUSTOM]  3/1/2024 (Approximate) 3/1/2025    Hemoglobin A1C [E] [[id number] CUSTOM]  3/1/2024 (Approximate) 3/1/2025    Lipid Panel [E] [[id number] CUSTOM]  3/1/2024 (Approximate) 3/1/2025    ThinPrep PAP with HPV Reflex Request B [[id number] CUSTOM]  3/1/2024 3/1/2025    TSH W Reflex To Free T4 [E] [[id number] CUSTOM]  3/1/2024 (Approximate) 3/1/2025      Follow-up Instructions    Return in about 1 year (around 3/1/2025), or if symptoms worsen or fail to improve.                  Did you know that [facility] primary care physicians now offer Video Visits through [employer] for adult patients for a variety of conditions such as allergies, back pain and cold symptoms? Skip the drive and waiting room and online chat with a doctor face-to-face using your web-cam enabled computer or mobile device wherever you are. Video Visits cost $50 and can be paid hassle-free using a credit, debit, or health savings card.  Not active on [employer]? Ask us how to get signed up today!          If you receive a survey from [employer], please take a few minutes to complete it and provide feedback. We strive to deliver the best patient experience and are looking for ways to make improvements. Your feedback will help us do so. For more information on [employer], please visit [email or web address]           No text in SmartText           No text in SmartText

## (undated) NOTE — LETTER
10/28/24  CONSENT FOR PROCEDURE/SEDATION    1. I authorize the performance upon Ruth Rodriguez   IUD insertion and removal.    2. I authorize ____________ (and whomever is designated as the doctor’s assistant), to perform the above-mentioned procedures.    3. If any unforeseen conditions arise during this procedure calling for additional  procedures, operations, or medications (including anesthesia and blood transfusion), I further request and authorize the doctor to do whatever he/she deems advisable in my interest.    4. I have been informed by my doctor of the nature and purpose of this procedure sedation, possible alternative methods of treatment, risk involved and possible complications.      Signature of Patient:_______________________________________________    Signature of person authorized to consent for patient:  _______________________________________________________________    Relationship to patient: ____________________________________________    Witness: _________________________________________ Date:___________